# Patient Record
Sex: FEMALE | Race: WHITE | URBAN - METROPOLITAN AREA
[De-identification: names, ages, dates, MRNs, and addresses within clinical notes are randomized per-mention and may not be internally consistent; named-entity substitution may affect disease eponyms.]

---

## 2017-07-28 ENCOUNTER — NEW PATIENT (OUTPATIENT)
Dept: URBAN - METROPOLITAN AREA CLINIC 30 | Facility: CLINIC | Age: 57
End: 2017-07-28
Payer: COMMERCIAL

## 2017-07-28 DIAGNOSIS — H43.812 VITREOUS DEGENERATION, LEFT EYE: Primary | ICD-10-CM

## 2017-07-28 PROCEDURE — 92134 CPTRZ OPH DX IMG PST SGM RTA: CPT | Performed by: OPTOMETRIST

## 2017-07-28 PROCEDURE — 92004 COMPRE OPH EXAM NEW PT 1/>: CPT | Performed by: OPTOMETRIST

## 2017-07-28 ASSESSMENT — INTRAOCULAR PRESSURE
OD: 16
OS: 17

## 2017-07-28 ASSESSMENT — VISUAL ACUITY
OD: 20/20
OS: 20/25

## 2017-07-28 ASSESSMENT — KERATOMETRY
OD: 46.40
OS: 46.11

## 2021-01-22 ENCOUNTER — OFFICE VISIT (OUTPATIENT)
Dept: URBAN - METROPOLITAN AREA CLINIC 41 | Facility: CLINIC | Age: 61
End: 2021-01-22
Payer: COMMERCIAL

## 2021-01-22 DIAGNOSIS — H35.372 PUCKERING OF MACULA, LEFT EYE: ICD-10-CM

## 2021-01-22 DIAGNOSIS — H43.813 VITREOUS DEGENERATION, BILATERAL: Primary | ICD-10-CM

## 2021-01-22 DIAGNOSIS — H33.313 HORSESHOE TEAR OF RETINA WITHOUT DETACHMENT, BILATERAL: ICD-10-CM

## 2021-01-22 PROCEDURE — 92201 OPSCPY EXTND RTA DRAW UNI/BI: CPT | Performed by: OPHTHALMOLOGY

## 2021-01-22 PROCEDURE — 92014 COMPRE OPH EXAM EST PT 1/>: CPT | Performed by: OPHTHALMOLOGY

## 2021-01-22 PROCEDURE — 92134 CPTRZ OPH DX IMG PST SGM RTA: CPT | Performed by: OPHTHALMOLOGY

## 2021-01-22 ASSESSMENT — INTRAOCULAR PRESSURE
OS: 13
OD: 12

## 2021-01-22 NOTE — IMPRESSION/PLAN
Impression: Vitreous degeneration, bilateral: H43.813. OU. Plan: Patient notes persistent floater in the left eye and new floater OD. OCT confirms VMA as released OS, but not OD. No RT/RD seen on exam today. PPV vs. observation. Patient elects to continue with observation. RDW.

## 2021-01-22 NOTE — IMPRESSION/PLAN
Impression: Puckering of macula, left eye: H35.372. OS. Status: Asymptomatic. Plan: OCT confirms PPERM. The diagnosis, natural history, and prognosis of epiretinal membranes, as well as the risks and benefits of vitrectomy with membrane peeling versus observation were discussed at length. Given the patient's current visual acuity and minimal hindrance on activities of daily living, observation was recommended at this time. Patient to call with any sudden vision changes. Discussed Patient may benefit with new glasses. Cleared for Mrx. 

RTC:  12 months DFE with OCT,OU

## 2021-01-22 NOTE — IMPRESSION/PLAN
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
s/p Cryo in past Plan: Patient presents with new floaters OD. No new RT or RD on SD exam today. Pt is a moderate myope (-7 OU) Precautions discussed. Patient will be traveling for birth of daughters.

## 2024-02-23 ENCOUNTER — OFFICE VISIT (OUTPATIENT)
Dept: URBAN - METROPOLITAN AREA CLINIC 41 | Facility: CLINIC | Age: 64
End: 2024-02-23
Payer: COMMERCIAL

## 2024-02-23 DIAGNOSIS — H43.813 VITREOUS DEGENERATION, BILATERAL: ICD-10-CM

## 2024-02-23 DIAGNOSIS — H33.313 HORSESHOE TEAR OF RETINA WITHOUT DETACHMENT, BILATERAL: ICD-10-CM

## 2024-02-23 DIAGNOSIS — H35.372 PUCKERING OF MACULA, LEFT EYE: Primary | ICD-10-CM

## 2024-02-23 PROCEDURE — 92134 CPTRZ OPH DX IMG PST SGM RTA: CPT | Performed by: OPHTHALMOLOGY

## 2024-02-23 PROCEDURE — 99204 OFFICE O/P NEW MOD 45 MIN: CPT | Performed by: OPHTHALMOLOGY

## 2024-02-23 ASSESSMENT — INTRAOCULAR PRESSURE
OS: 20
OD: 17

## 2024-09-18 ENCOUNTER — OFFICE VISIT (OUTPATIENT)
Dept: URBAN - METROPOLITAN AREA CLINIC 41 | Facility: CLINIC | Age: 64
End: 2024-09-18
Payer: COMMERCIAL

## 2024-09-18 DIAGNOSIS — H43.813 VITREOUS DEGENERATION, BILATERAL: ICD-10-CM

## 2024-09-18 DIAGNOSIS — H33.313 HORSESHOE TEAR OF RETINA WITHOUT DETACHMENT, BILATERAL: ICD-10-CM

## 2024-09-18 DIAGNOSIS — H35.372 PUCKERING OF MACULA, LEFT EYE: Primary | ICD-10-CM

## 2024-09-18 PROCEDURE — 99214 OFFICE O/P EST MOD 30 MIN: CPT | Performed by: OPHTHALMOLOGY

## 2024-09-18 PROCEDURE — 92134 CPTRZ OPH DX IMG PST SGM RTA: CPT | Performed by: OPHTHALMOLOGY

## 2024-09-18 ASSESSMENT — INTRAOCULAR PRESSURE
OS: 14
OD: 14